# Patient Record
Sex: FEMALE | Race: OTHER | HISPANIC OR LATINO | URBAN - METROPOLITAN AREA
[De-identification: names, ages, dates, MRNs, and addresses within clinical notes are randomized per-mention and may not be internally consistent; named-entity substitution may affect disease eponyms.]

---

## 2020-07-10 ENCOUNTER — OFFICE VISIT (OUTPATIENT)
Dept: URBAN - METROPOLITAN AREA CLINIC 63 | Facility: CLINIC | Age: 65
End: 2020-07-10

## 2020-09-22 ENCOUNTER — LAB OUTSIDE AN ENCOUNTER (OUTPATIENT)
Dept: URBAN - METROPOLITAN AREA CLINIC 121 | Facility: CLINIC | Age: 65
End: 2020-09-22

## 2020-10-13 ENCOUNTER — OFFICE VISIT (OUTPATIENT)
Dept: URBAN - METROPOLITAN AREA TELEHEALTH 2 | Facility: TELEHEALTH | Age: 65
End: 2020-10-13

## 2020-10-16 ENCOUNTER — LAB OUTSIDE AN ENCOUNTER (OUTPATIENT)
Dept: URBAN - METROPOLITAN AREA CLINIC 121 | Facility: CLINIC | Age: 65
End: 2020-10-16

## 2020-10-20 LAB — FECAL GLOBIN BY IMMUNOCHEMISTRY: (no result)

## 2020-10-22 LAB
CALPROTECTIN, STOOL: (no result)
CAMPYLOBACTER, CULTURE: (no result)
CLOSTRIDIUM DIFFICILE TOXIN/GDH W/REFL TO: (no result)
FECAL LEUKOCYTE STAIN: (no result)
GIARDIA AG, EIA, STOOL: (no result)
LACTOFERRIN, QN, STOOL: (no result)
OVA AND PARASITES, CONC AND PERM SMEAR: (no result)
PANCREATIC ELASTASE-1: (no result)
SALMONELLA AND SHIGELLA, CULTURE: (no result)
SHIGA TOXINS, EIA W/RFL TO E.COLI O157: (no result)

## 2020-11-17 ENCOUNTER — OFFICE VISIT (OUTPATIENT)
Dept: URBAN - METROPOLITAN AREA TELEHEALTH 2 | Facility: TELEHEALTH | Age: 65
End: 2020-11-17

## 2021-01-29 ENCOUNTER — OFFICE VISIT (OUTPATIENT)
Dept: URBAN - METROPOLITAN AREA SURGERY CENTER 4 | Facility: SURGERY CENTER | Age: 66
End: 2021-01-29

## 2021-02-02 LAB — PATHOLOGY (INDENTED REPORT): (no result)

## 2021-02-16 ENCOUNTER — OFFICE VISIT (OUTPATIENT)
Dept: URBAN - METROPOLITAN AREA TELEHEALTH 2 | Facility: TELEHEALTH | Age: 66
End: 2021-02-16

## 2021-04-20 ENCOUNTER — OFFICE VISIT (OUTPATIENT)
Dept: URBAN - METROPOLITAN AREA TELEHEALTH 2 | Facility: TELEHEALTH | Age: 66
End: 2021-04-20

## 2022-04-26 ENCOUNTER — OFFICE VISIT (OUTPATIENT)
Dept: URBAN - METROPOLITAN AREA CLINIC 63 | Facility: CLINIC | Age: 67
End: 2022-04-26

## 2022-06-24 ENCOUNTER — OFFICE VISIT (OUTPATIENT)
Dept: URBAN - METROPOLITAN AREA SURGERY CENTER 4 | Facility: SURGERY CENTER | Age: 67
End: 2022-06-24

## 2022-07-09 ENCOUNTER — TELEPHONE ENCOUNTER (OUTPATIENT)
Dept: URBAN - METROPOLITAN AREA CLINIC 121 | Facility: CLINIC | Age: 67
End: 2022-07-09

## 2022-07-09 RX ORDER — OMEPRAZOLE 40 MG/1
CAPSULE, DELAYED RELEASE ORAL
Refills: 0 | OUTPATIENT
Start: 2019-07-12 | End: 2019-11-06

## 2022-07-09 RX ORDER — SUCRALFATE 1 G/1
TAKE 1 TABLET BY MOUTH TWICE A DAY TABLET ORAL
Refills: 0 | OUTPATIENT
Start: 2021-02-22 | End: 2021-03-24

## 2022-07-09 RX ORDER — OMEPRAZOLE 20 MG/1
TWICE A DAY CAPSULE, DELAYED RELEASE ORAL TWICE A DAY
Refills: 3 | OUTPATIENT
Start: 2021-02-16 | End: 2021-06-23

## 2022-07-09 RX ORDER — FAMOTIDINE 40 MG/1
ONCE A DAY TABLET, FILM COATED ORAL ONCE A DAY
Refills: 1 | OUTPATIENT
Start: 2020-11-17 | End: 2021-01-28

## 2022-07-09 RX ORDER — UBIDECARENONE 200 MG
CAPSULE ORAL
Refills: 0 | OUTPATIENT
Start: 2018-09-25 | End: 2019-04-23

## 2022-07-09 RX ORDER — OMEPRAZOLE 40 MG/1
ONCE A DAY CAPSULE, DELAYED RELEASE ORAL ONCE A DAY
Refills: 1 | OUTPATIENT
Start: 2018-09-25 | End: 2019-04-23

## 2022-07-09 RX ORDER — SUCRALFATE 1 G/1
TWICE A DAY TABLET ORAL TWICE A DAY
Refills: 3 | OUTPATIENT
Start: 2019-07-12 | End: 2019-11-06

## 2022-07-09 RX ORDER — FAMOTIDINE 40 MG/1
TAKE ONE TABLET BY MOUTH ONCE A DAY TABLET, FILM COATED ORAL
Refills: 1 | OUTPATIENT
Start: 2021-01-29 | End: 2021-01-29

## 2022-07-09 RX ORDER — FAMOTIDINE 40 MG/1
TAKE ONE TABLET BY MOUTH ONCE A DAY TABLET, FILM COATED ORAL
Refills: 1 | OUTPATIENT
Start: 2021-01-28 | End: 2021-01-29

## 2022-07-09 RX ORDER — OMEPRAZOLE 40 MG/1
CAPSULE, DELAYED RELEASE ORAL
Refills: 0 | OUTPATIENT
Start: 2019-04-23 | End: 2019-07-12

## 2022-07-09 RX ORDER — PSYLLIUM SEED
ONCE A DAY PACKET (EA) ORAL ONCE A DAY
Refills: 3 | OUTPATIENT
Start: 2016-06-06 | End: 2016-06-06

## 2022-07-09 RX ORDER — ERGOCALCIFEROL (VITAMIN D2) 10 MCG
TABLET ORAL
Refills: 0 | OUTPATIENT
Start: 2018-09-25 | End: 2019-04-23

## 2022-07-09 RX ORDER — CETIRIZINE HYDROCHLORIDE 10 MG/1
ONCE A DAY CAPSULE, LIQUID FILLED ORAL ONCE A DAY
Refills: 0 | OUTPATIENT
Start: 2016-03-29 | End: 2018-09-25

## 2022-07-09 RX ORDER — OMEPRAZOLE 20 MG/1
TAKE 1 CAPSULE BY MOUTH TWICE A DAY CAPSULE, DELAYED RELEASE ORAL
Refills: 0 | OUTPATIENT
Start: 2021-06-23 | End: 2021-12-13

## 2022-07-09 RX ORDER — OMEPRAZOLE 20 MG/1
CAPSULE, DELAYED RELEASE ORAL ONCE A DAY
Refills: 0 | OUTPATIENT
Start: 2016-03-29 | End: 2016-06-06

## 2022-07-09 RX ORDER — OMEPRAZOLE 20 MG/1
TWICE A DAY CAPSULE, DELAYED RELEASE ORAL TWICE A DAY
Refills: 1 | OUTPATIENT
Start: 2020-11-17 | End: 2021-01-05

## 2022-07-09 RX ORDER — OMEPRAZOLE 20 MG/1
CAPSULE, DELAYED RELEASE ORAL ONCE A DAY
Refills: 0 | OUTPATIENT
Start: 2016-06-06 | End: 2018-09-25

## 2022-07-09 RX ORDER — ACETAMINOPHEN, ASPIRIN (NSAID) AND CAFFEINE 250; 250; 65 MG/1; MG/1; MG/1
TABLET, FILM COATED ORAL ONCE A DAY
Refills: 0 | OUTPATIENT
Start: 2016-03-29 | End: 2016-06-06

## 2022-07-09 RX ORDER — UBIDECARENONE 200 MG
CAPSULE ORAL
Refills: 0 | OUTPATIENT
Start: 2019-07-12 | End: 2019-11-06

## 2022-07-09 RX ORDER — SUCRALFATE 1 G/1
TWICE A DAY TABLET ORAL TWICE A DAY
Refills: 0 | OUTPATIENT
Start: 2021-01-29 | End: 2021-02-22

## 2022-07-09 RX ORDER — FAMOTIDINE 20 MG/1
ONCE A DAY TABLET ORAL ONCE A DAY
Refills: 1 | OUTPATIENT
Start: 2020-09-22 | End: 2020-09-22

## 2022-07-09 RX ORDER — FAMOTIDINE 10 MG
TWICE A DAY TABLET ORAL TWICE A DAY
Refills: 1 | OUTPATIENT
Start: 2018-10-12 | End: 2018-12-18

## 2022-07-09 RX ORDER — OMEPRAZOLE 20 MG/1
ONCE A DAY CAPSULE, DELAYED RELEASE ORAL ONCE A DAY
Refills: 3 | OUTPATIENT
Start: 2019-11-06 | End: 2020-08-21

## 2022-07-09 RX ORDER — OMEPRAZOLE 20 MG/1
TWICE A DAY CAPSULE, DELAYED RELEASE ORAL TWICE A DAY
Refills: 1 | OUTPATIENT
Start: 2021-01-05 | End: 2021-02-16

## 2022-07-09 RX ORDER — OMEPRAZOLE 20 MG/1
TABLET, DELAYED RELEASE ORAL ONCE A DAY
Refills: 0 | OUTPATIENT
Start: 2019-11-06 | End: 2020-11-17

## 2022-07-09 RX ORDER — UBIDECARENONE 200 MG
CAPSULE ORAL
Refills: 0 | OUTPATIENT
Start: 2019-04-23 | End: 2019-07-12

## 2022-07-10 ENCOUNTER — TELEPHONE ENCOUNTER (OUTPATIENT)
Dept: URBAN - METROPOLITAN AREA CLINIC 121 | Facility: CLINIC | Age: 67
End: 2022-07-10

## 2022-07-10 RX ORDER — SUCRALFATE 1 G/1
THREE TIMES A DAY TABLET ORAL THREE TIMES A DAY
Refills: 1 | Status: ACTIVE | COMMUNITY
Start: 2018-10-12

## 2022-07-10 RX ORDER — OMEPRAZOLE 20 MG/1
TAKE 1 CAPSULE BY MOUTH EVERY DAY CAPSULE, DELAYED RELEASE ORAL
Refills: 3 | Status: ACTIVE | COMMUNITY
Start: 2020-08-21

## 2022-07-10 RX ORDER — FAMOTIDINE 40 MG/1
ONCE A DAY TABLET, FILM COATED ORAL ONCE A DAY
Refills: 0 | Status: ACTIVE | COMMUNITY
Start: 2021-02-01

## 2022-07-10 RX ORDER — SUCRALFATE 1 G/1
TWICE A DAY TABLET ORAL TWICE A DAY
Refills: 1 | Status: ACTIVE | COMMUNITY
Start: 2019-11-06

## 2022-07-10 RX ORDER — FAMOTIDINE 10 MG
TAKE 1 CAPSULE BY MOUTH TWICE DAILY TABLET ORAL
Refills: 1 | Status: ACTIVE | COMMUNITY
Start: 2018-12-18

## 2022-07-10 RX ORDER — MV-MIN/FOLIC/VIT K/LYCOP/COQ10 200-100MCG
CAPSULE ORAL ONCE A DAY
Refills: 0 | Status: ACTIVE | COMMUNITY
Start: 2019-11-06

## 2022-07-10 RX ORDER — OMEPRAZOLE 20 MG/1
CAPSULE, DELAYED RELEASE ORAL ONCE A DAY
Refills: 0 | Status: ACTIVE | COMMUNITY
Start: 2021-09-17

## 2022-07-10 RX ORDER — DEXLANSOPRAZOLE 60 MG/1
ONCE A DAY CAPSULE, DELAYED RELEASE ORAL ONCE A DAY
Refills: 1 | Status: ACTIVE | COMMUNITY
Start: 2022-04-26

## 2022-07-10 RX ORDER — PSYLLIUM SEED
ONCE A DAY PACKET (EA) ORAL ONCE A DAY
Refills: 3 | Status: ACTIVE | COMMUNITY
Start: 2016-06-06

## 2022-07-10 RX ORDER — OMEPRAZOLE 20 MG/1
TAKE 1 CAPSULE BY MOUTH TWICE A DAY CAPSULE, DELAYED RELEASE ORAL
Refills: 0 | Status: ACTIVE | COMMUNITY
Start: 2021-12-13

## 2022-07-10 RX ORDER — SUCRALFATE 1 G/1
TAKE 1 TABLET BY MOUTH TWICE A DAY TABLET ORAL
Refills: 0 | Status: ACTIVE | COMMUNITY
Start: 2021-03-24

## 2022-07-10 RX ORDER — FAMOTIDINE 20 MG/1
ONCE A DAY TABLET ORAL ONCE A DAY
Refills: 1 | Status: ACTIVE | COMMUNITY
Start: 2020-10-08

## 2022-07-11 ENCOUNTER — OFFICE VISIT (OUTPATIENT)
Dept: URBAN - METROPOLITAN AREA CLINIC 63 | Facility: CLINIC | Age: 67
End: 2022-07-11
Payer: MEDICARE

## 2022-07-11 ENCOUNTER — DASHBOARD ENCOUNTERS (OUTPATIENT)
Age: 67
End: 2022-07-11

## 2022-07-11 ENCOUNTER — WEB ENCOUNTER (OUTPATIENT)
Dept: URBAN - METROPOLITAN AREA CLINIC 63 | Facility: CLINIC | Age: 67
End: 2022-07-11

## 2022-07-11 VITALS
OXYGEN SATURATION: 98 % | WEIGHT: 129.4 LBS | TEMPERATURE: 97.8 F | HEART RATE: 77 BPM | SYSTOLIC BLOOD PRESSURE: 124 MMHG | HEIGHT: 64 IN | DIASTOLIC BLOOD PRESSURE: 80 MMHG | BODY MASS INDEX: 22.09 KG/M2

## 2022-07-11 DIAGNOSIS — D12.5 ADENOMA OF SIGMOID COLON: ICD-10-CM

## 2022-07-11 DIAGNOSIS — K64.1 GRADE II HEMORRHOIDS: ICD-10-CM

## 2022-07-11 DIAGNOSIS — K29.50 OTHER CHRONIC GASTRITIS WITHOUT HEMORRHAGE: ICD-10-CM

## 2022-07-11 DIAGNOSIS — K59.04 CHRONIC IDIOPATHIC CONSTIPATION: ICD-10-CM

## 2022-07-11 DIAGNOSIS — K57.90 DIVERTICULAR DISEASE: ICD-10-CM

## 2022-07-11 PROBLEM — 82934008: Status: ACTIVE | Noted: 2022-07-11

## 2022-07-11 PROBLEM — 397881000: Status: ACTIVE | Noted: 2022-07-11

## 2022-07-11 PROBLEM — 721704005: Status: ACTIVE | Noted: 2022-07-11

## 2022-07-11 PROBLEM — 8493009: Status: ACTIVE | Noted: 2022-07-11

## 2022-07-11 PROBLEM — 428054006: Status: ACTIVE | Noted: 2022-07-11

## 2022-07-11 PROCEDURE — 99214 OFFICE O/P EST MOD 30 MIN: CPT | Performed by: NURSE PRACTITIONER

## 2022-07-11 RX ORDER — LINACLOTIDE 72 UG/1
1 CAPSULE AT LEAST 30 MINUTES BEFORE THE FIRST MEAL OF THE DAY ON AN EMPTY STOMACH CAPSULE, GELATIN COATED ORAL ONCE A DAY
Qty: 60 | Refills: 1 | OUTPATIENT
Start: 2022-07-11 | End: 2022-11-07

## 2022-07-11 RX ORDER — OMEPRAZOLE 20 MG/1
1 CAPSULE 30 MINUTES BEFORE MORNING MEAL CAPSULE, DELAYED RELEASE ORAL ONCE A DAY
Qty: 90 | Refills: 1 | OUTPATIENT
Start: 2022-07-11

## 2022-07-11 RX ORDER — OMEPRAZOLE 20 MG/1
1 CAPSULE 30 MINUTES BEFORE MORNING MEAL CAPSULE, DELAYED RELEASE ORAL ONCE A DAY
Status: ACTIVE | COMMUNITY

## 2022-07-11 NOTE — PHYSICAL EXAM GASTROINTESTINAL
Abdomen: Soft, mild epigastric tenderness, nondistended , no guarding or rigidity , no masses palpable , normal bowel sounds , Liver and Spleen , no hepatomegaly present , no hepatosplenomegaly , liver nontender , spleen not palpable

## 2022-07-11 NOTE — HPI-TODAY'S VISIT:
Patient with chronic h/o GERD and duodenitis on omeprazole, patient with allergies states have some rash related to migraine treatment.  Patient with chronic constipation denies family h/o colon cancer. Will plan EGD and colonoscopy.eg Patient colonoscopy with evidence of one hyperplastic polyp, internal hemorrhoids and diverticular disease. EGD positive for H hernia and chronic gastritis. Today here with the complaint above, see HPI. Patient will do diet and treatment bellow. Will plan repeat colonoscopy in 5 years. 9/18: Patient with migraine and reflux, and constipation. Patient takes PPI prn.  Patient will need to be back on PPI daily and will do trial with Linzess. Will follow in 2 months.  4/19: Patient persist with constipation on fiber and MiraLAX, also persist with migraine, Patient did not tolerate the Linzess was "too strong" had diarrhea with it. Will adjust dose of MiraLAX. Pateint had rash related to allergies, on Zyrtec. Will do ultrasound of the  GB and consider HIDA scan, Stool for Giardia. Will follow in 2 months.  7/19  Patient here today complaining of having gastritis symptoms in severe esophageal reflux she is complaining of having ulcers in her mouth and burning sensation of her tongue, lips, and oral mucosa.  Patient has endoscopy done 3 years ago with evidence of small hiatal hernia chronic gastritis and esophageal reflux.  Patient has done an ultrasound of the liver with borderline 7 mm duct and also fatty liver.  Patient stool studies are negative. Plan: Patient will have an EGD, MRCP, via treatment for gastritis and reflux. 11/19 Patient laboratories: Hepatitis viral panel positive hepatitis B surface antibody, with negative hepatitis B core antibody, negative hepatitis C, negative for autoimmune hepatitis, negative stool studies, lactoferrin and calprotectin, negative, alpha-fetoprotein and liver enzymes are normal, renal function, electrolytes and CBC are normal.  Patient MRCP questionable area of decreases sign which may be associated with a mass or area of inflammation involving the body tail of the pancreas, normal biliary tree. The triphasic  CT scan abdomen shows evidence of enlarged liver with low-attenuation which could be due to fatty infiltration and possible scattered cyst, measuring less than 0.3 cm.  No pancreatic mass or peripancreatic adenopathy or inflammation, a non-obstructing calculus in the left kidney, no hydronephrosis.  Diverticular disease of the ascending colon, negative for obstruction.  Patient here today complaining of having idiopathic chronic constipation, she said constipation started more than 3 years ago she has been trying OTC laxative like milk of magnesia, lactulose, Dulcolax, MiraLAX, prune juice, fever and that not working anymore for her.  Patient also complaining of gastritis and reflux symptoms.  She states she is just taking omeprazole once a day, which gave her some release but, she is not taking the Carafate because of the high co payment for this medication. Plan: Patient will follow up with her primary doctor about the CT scan results of left kidney stone.  Also, she is going to increase water exercises and fiber intake will do a trial with some samples of Linzess for her constipation. Will do diet and will resume treatment with PPI and will try Carafate 1 g twice a day. Patient studies are negative for any process going over the pancreas, there are some small cysts in the liver and fatty liver that we are going to follow-up in 6 months. If patient of gastritis and reflux do not succeed we may do EGD. 11/20: Patient had labs from October 2020, stool studies that are normal for calprotectin lactoferrin infection for C. difficile Giardia Salmonella Shigella, negative for occult blood and normal pancreatic elastase. Patient complaints of heartburn and reflux. Will do diet and will increase PPI BID, will do EGD. Will continue with fiber and water and this help her with her constipation.  2/21: Patient had upper endoscopy 29 January 2021 with the impression of normal upper esophagus and mid esophagus with changes suspicious for short segment Verma's esophagus in the distal esophagus.  Small hiatal hernia, chronic gastritis and mild duodenitis.  Pathology shows the duodenum mucosa with ulceration and reactive changes no evidence of Whipple's disease or intestinal parasites.  Stomach with reactive gastropathy no significant acute or chronic inflammation negative for H. pylori.  Distal esophagus biopsies showed squamous columnar junction mucosa with evidence of intestinal metaplasia consistent with Verma's esophagus.  Patient will need to be on treatment for reflux.  On PPI for now, will need to avoid use of NSAIDs.  Will need a repeat endoscopy in a year from now. Will do follow in 6 months. 4/22: Patient with reflux and personal h/o Verma's esophagus, will need surveillance EGD and is due for her surveillance colonoscopy. Her father with h/o colon polyps. Will plan EGD and colonoscopy and will change to Dexilant. Patient denies dysphagia. Will continue diet  for reflux and constipation.  7/22 Patient colonoscopy shows evidence of a 5 mm sessile serrated adenoma polyp into the sigmoid colon, mild diverticular disease of the sigmoid colon, internal hemorrhoids, and melanosis in the colon.  EGD shows evidence of lower esophageal mucosa with changes secondary to established at short segment of Verma's esophagus, small hiatal hernia, and chronic gastritis.  Biopsy results: Duodenum mucosa without histologic diagnostic.  Stomach, antrum/body biopsy negative for H. pylori.  Lower esophagus and GE junction biopsy: Cardia type gastric mucosa negative for intestinal metaplasia.

## 2022-10-17 ENCOUNTER — TELEPHONE ENCOUNTER (OUTPATIENT)
Dept: URBAN - METROPOLITAN AREA CLINIC 63 | Facility: CLINIC | Age: 67
End: 2022-10-17

## 2022-10-17 RX ORDER — OMEPRAZOLE 20 MG/1
1 CAPSULE 30 MINUTES BEFORE MORNING MEAL CAPSULE, DELAYED RELEASE ORAL ONCE A DAY
Qty: 30 | OUTPATIENT
Start: 2022-10-19

## 2022-10-19 ENCOUNTER — ERX REFILL RESPONSE (OUTPATIENT)
Dept: URBAN - METROPOLITAN AREA CLINIC 57 | Facility: CLINIC | Age: 67
End: 2022-10-19

## 2022-10-19 RX ORDER — OMEPRAZOLE 20 MG/1
1 CAPSULE 30 MINUTES BEFORE MORNING MEAL CAPSULE, DELAYED RELEASE ORAL ONCE A DAY
Qty: 90 | Refills: 1 | OUTPATIENT

## 2022-10-19 RX ORDER — OMEPRAZOLE 20 MG/1
TAKE 1 CAPSULE BY MOUTH EVERY DAY 30 MINUTES BEFORE MORNING MEAL FOR 90 DAYS CAPSULE, DELAYED RELEASE ORAL
Qty: 90 CAPSULE | Refills: 1 | OUTPATIENT

## 2023-09-05 ENCOUNTER — TELEPHONE ENCOUNTER (OUTPATIENT)
Dept: URBAN - METROPOLITAN AREA CLINIC 63 | Facility: CLINIC | Age: 68
End: 2023-09-05

## 2025-08-25 ENCOUNTER — OFFICE VISIT (OUTPATIENT)
Dept: URBAN - METROPOLITAN AREA CLINIC 63 | Facility: CLINIC | Age: 70
End: 2025-08-25
Payer: COMMERCIAL

## 2025-08-25 ENCOUNTER — OFFICE VISIT (OUTPATIENT)
Dept: URBAN - METROPOLITAN AREA CLINIC 63 | Facility: CLINIC | Age: 70
End: 2025-08-25

## 2025-08-25 DIAGNOSIS — K29.50 OTHER CHRONIC GASTRITIS WITHOUT HEMORRHAGE: ICD-10-CM

## 2025-08-25 DIAGNOSIS — K21.9 GASTROESOPHAGEAL REFLUX DISEASE, UNSPECIFIED WHETHER ESOPHAGITIS PRESENT: ICD-10-CM

## 2025-08-25 DIAGNOSIS — R10.13 EPIGASTRIC PAIN: ICD-10-CM

## 2025-08-25 PROCEDURE — 99214 OFFICE O/P EST MOD 30 MIN: CPT | Performed by: NURSE PRACTITIONER

## 2025-08-25 RX ORDER — OMEPRAZOLE 20 MG/1
1 CAPSULE 1/2 TO 1 HOUR BEFORE MORNING MEAL CAPSULE, DELAYED RELEASE ORAL ONCE A DAY
Status: ACTIVE | COMMUNITY

## 2025-08-25 RX ORDER — FAMOTIDINE 40 MG/1
1 TABLET AT BEDTIME AS NEEDED TABLET, FILM COATED ORAL ONCE A DAY
Qty: 30 | Refills: 2 | OUTPATIENT
Start: 2025-08-25